# Patient Record
Sex: MALE | Race: BLACK OR AFRICAN AMERICAN | Employment: FULL TIME | URBAN - METROPOLITAN AREA
[De-identification: names, ages, dates, MRNs, and addresses within clinical notes are randomized per-mention and may not be internally consistent; named-entity substitution may affect disease eponyms.]

---

## 2019-05-14 ENCOUNTER — OFFICE VISIT (OUTPATIENT)
Dept: FAMILY MEDICINE CLINIC | Age: 34
End: 2019-05-14

## 2019-05-14 VITALS
OXYGEN SATURATION: 98 % | SYSTOLIC BLOOD PRESSURE: 115 MMHG | BODY MASS INDEX: 33.75 KG/M2 | HEART RATE: 64 BPM | TEMPERATURE: 98 F | RESPIRATION RATE: 18 BRPM | HEIGHT: 66 IN | WEIGHT: 210 LBS | DIASTOLIC BLOOD PRESSURE: 78 MMHG

## 2019-05-14 DIAGNOSIS — M75.101 ROTATOR CUFF SYNDROME, RIGHT: ICD-10-CM

## 2019-05-14 DIAGNOSIS — M54.50 ACUTE BILATERAL LOW BACK PAIN WITHOUT SCIATICA: Primary | ICD-10-CM

## 2019-05-14 RX ORDER — METHOCARBAMOL 500 MG/1
500 TABLET, FILM COATED ORAL
COMMUNITY
Start: 2019-05-07 | End: 2019-05-14

## 2019-05-14 RX ORDER — DICLOFENAC SODIUM 75 MG/1
75 TABLET, DELAYED RELEASE ORAL
COMMUNITY
Start: 2019-05-07 | End: 2020-05-06

## 2019-05-14 RX ORDER — TIZANIDINE 4 MG/1
4 TABLET ORAL
Qty: 30 TAB | Refills: 2 | Status: SHIPPED | OUTPATIENT
Start: 2019-05-14 | End: 2021-05-05

## 2019-05-14 NOTE — LETTER
NOTIFICATION RETURN TO WORK / SCHOOL 
 
5/14/2019 11:32 AM 
 
Mr. Tish Ocampo [address] [facility] [address] To Whom It May Concern: 
 
Tish Ocampo is currently under the care of [address] [place] [address]. He is experiencing back pain. This can be expected to the last 6 weeks. While he is experiencing back pain he should be allowed to restrict his lifting to no more than 20 pounds. He should take care not to lift and twist.  If he follows the lift restriction of 20 pounds he should be able to lift from floor or countertop level. If there are questions or concerns please have the patient contact our office. Sincerely, Kimberly Fulton MD

## 2019-05-14 NOTE — PROGRESS NOTES
.  Chief Complaint   Patient presents with    Back Pain     pain radiates down left thigh    Arm Pain     . 1. Have you been to the ER, urgent care clinic since your last visit? Hospitalized since your last visit? yes    2. Have you seen or consulted any other health care providers outside of the [address] [place] [address] since your last visit? Include any pap smears or colon screening. No    .  Health Maintenance Due   Topic Date Due    DTaP/Tdap/Td series (1 - Tdap) 12/13/2008     . Dashawn Lawrence

## 2019-05-14 NOTE — PATIENT INSTRUCTIONS
Low Back Pain: Exercises  Your Care Instructions  Here are some examples of typical rehabilitation exercises for your condition. Start each exercise slowly. Ease off the exercise if you start to have pain. Your doctor or physical therapist will tell you when you can start these exercises and which ones will work best for you. How to do the exercises  Press-up    1. Lie on your stomach, supporting your body with your forearms. 2. Press your elbows down into the floor to raise your upper back. As you do this, relax your stomach muscles and allow your back to arch without using your back muscles. As your press up, do not let your hips or pelvis come off the floor. 3. Hold for 15 to 30 seconds, then relax. 4. Repeat 2 to 4 times. Alternate arm and leg (bird dog) exercise    1. Start on the floor, on your hands and knees. 2. Tighten your belly muscles. 3. Raise one leg off the floor, and hold it straight out behind you. Be careful not to let your hip drop down, because that will twist your trunk. 4. Hold for about 6 seconds, then lower your leg and switch to the other leg. 5. Repeat 8 to 12 times on each leg. 6. Over time, work up to holding for 10 to 30 seconds each time. 7. If you feel stable and secure with your leg raised, try raising the opposite arm straight out in front of you at the same time. Knee-to-chest exercise    1. Lie on your back with your knees bent and your feet flat on the floor. 2. Bring one knee to your chest, keeping the other foot flat on the floor (or keeping the other leg straight, whichever feels better on your lower back). 3. Keep your lower back pressed to the floor. Hold for at least 15 to 30 seconds. 4. Relax, and lower the knee to the starting position. 5. Repeat with the other leg. Repeat 2 to 4 times with each leg. 6. To get more stretch, put your other leg flat on the floor while pulling your knee to your chest.    Curl-ups    1.  Lie on the floor on your back with your knees bent at a 90-degree angle. Your feet should be flat on the floor, about 12 inches from your buttocks. 2. Cross your arms over your chest. If this bothers your neck, try putting your hands behind your neck (not your head), with your elbows spread apart. 3. Slowly tighten your belly muscles and raise your shoulder blades off the floor. 4. Keep your head in line with your body, and do not press your chin to your chest.  5. Hold this position for 1 or 2 seconds, then slowly lower yourself back down to the floor. 6. Repeat 8 to 12 times. Pelvic tilt exercise    1. Lie on your back with your knees bent. 2. \"Brace\" your stomach. This means to tighten your muscles by pulling in and imagining your belly button moving toward your spine. You should feel like your back is pressing to the floor and your hips and pelvis are rocking back. 3. Hold for about 6 seconds while you breathe smoothly. 4. Repeat 8 to 12 times. Heel dig bridging    1. Lie on your back with both knees bent and your ankles bent so that only your heels are digging into the floor. Your knees should be bent about 90 degrees. 2. Then push your heels into the floor, squeeze your buttocks, and lift your hips off the floor until your shoulders, hips, and knees are all in a straight line. 3. Hold for about 6 seconds as you continue to breathe normally, and then slowly lower your hips back down to the floor and rest for up to 10 seconds. 4. Do 8 to 12 repetitions. Hamstring stretch in doorway    1. Lie on your back in a doorway, with one leg through the open door. 2. Slide your leg up the wall to straighten your knee. You should feel a gentle stretch down the back of your leg. 3. Hold the stretch for at least 15 to 30 seconds. Do not arch your back, point your toes, or bend either knee. Keep one heel touching the floor and the other heel touching the wall. 4. Repeat with your other leg. 5. Do 2 to 4 times for each leg.     Hip flexor stretch    1. Kneel on the floor with one knee bent and one leg behind you. Place your forward knee over your foot. Keep your other knee touching the floor. 2. Slowly push your hips forward until you feel a stretch in the upper thigh of your rear leg. 3. Hold the stretch for at least 15 to 30 seconds. Repeat with your other leg. 4. Do 2 to 4 times on each side. Wall sit    1. Stand with your back 10 to 12 inches away from a wall. 2. Lean into the wall until your back is flat against it. 3. Slowly slide down until your knees are slightly bent, pressing your lower back into the wall. 4. Hold for about 6 seconds, then slide back up the wall. 5. Repeat 8 to 12 times. Follow-up care is a key part of your treatment and safety. Be sure to make and go to all appointments, and call your doctor if you are having problems. It's also a good idea to know your test results and keep a list of the medicines you take. Where can you learn more? Go to http://harjit-july.info/. Enter R951 in the search box to learn more about \"Low Back Pain: Exercises. \"  Current as of: September 20, 2018  Content Version: 11.9  © 6576-3550 [employer]. Care instructions adapted under license by [employer] (which disclaims liability or warranty for this information). If you have questions about a medical condition or this instruction, always ask your healthcare professional. [address] any warranty or liability for your use of this information. Rotator Cuff: Exercises  Your Care Instructions  Here are some examples of typical rehabilitation exercises for your condition. Start each exercise slowly. Ease off the exercise if you start to have pain. Your doctor or physical therapist will tell you when you can start these exercises and which ones will work best for you. How to do the exercises  Pendulum swing    1.  Hold on to a table or the back of a chair with your good arm. Then bend forward a little and let your sore arm hang straight down. This exercise does not use the arm muscles. Rather, use your legs and your hips to create movement that makes your arm swing freely. 2. Use the movement from your hips and legs to guide the slightly swinging arm back and forth like a pendulum (or elephant trunk). Then guide it in circles that start small (about the size of a dinner plate). Make the circles a bit larger each day, as your pain allows. 3. Do this exercise for 5 minutes, 5 to 7 times each day. 4. As you have less pain, try bending over a little farther to do this exercise. This will increase the amount of movement at your shoulder. Posterior stretching exercise    1. Hold the elbow of your injured arm with your other hand. 2. Use your hand to pull your injured arm gently up and across your body. You will feel a gentle stretch across the back of your injured shoulder. 3. Hold for at least 15 to 30 seconds. Then slowly lower your arm. 4. Repeat 2 to 4 times. Up-the-back stretch    1. Put your hand in your back pocket. Let it rest there to stretch your shoulder. 2. With your other hand, hold your injured arm (palm outward) behind your back by the wrist. Pull your arm up gently to stretch your shoulder. 3. Next, put a towel over your other shoulder. Put the hand of your injured arm behind your back. Now hold the back end of the towel. With the other hand, hold the front end of the towel in front of your body. Pull gently on the front end of the towel. This will bring your hand farther up your back to stretch your shoulder. Overhead stretch    1. Standing about an arm's length away, grasp onto a solid surface. You could use a countertop, a doorknob, or the back of a sturdy chair. 2. With your knees slightly bent, bend forward with your arms straight. Lower your upper body, and let your shoulders stretch.   3. As your shoulders are able to stretch farther, you may need to take a step or two backward. 4. Hold for at least 15 to 30 seconds. Then stand up and relax. If you had stepped back during your stretch, step forward so you can keep your hands on the solid surface. 5. Repeat 2 to 4 times. Shoulder flexion (lying down)    1. Lie on your back, holding a wand with both hands. Your palms should face down as you hold the wand. 2. Keeping your elbows straight, slowly raise your arms over your head. Raise them until you feel a stretch in your shoulders, upper back, and chest.  3. Hold for 15 to 30 seconds. 4. Repeat 2 to 4 times. Shoulder rotation (lying down)    1. Lie on your back. Hold a wand with both hands with your elbows bent and palms up. 2. Keep your elbows close to your body, and move the wand across your body toward the sore arm. 3. Hold for 8 to 12 seconds. 4. Repeat 2 to 4 times. Wall climbing (to the side)    1. Stand with your side to a wall so that your fingers can just touch it at an angle about 30 degrees toward the front of your body. 2. Walk the fingers of your injured arm up the wall as high as pain permits. Try not to shrug your shoulder up toward your ear as you move your arm up. 3. Hold that position for a count of at least 15 to 20.  4. Walk your fingers back down to the starting position. 5. Repeat at least 2 to 4 times. Try to reach higher each time. Wall climbing (to the front)    1. Face a wall, and stand so your fingers can just touch it. 2. Keeping your shoulder down, walk the fingers of your injured arm up the wall as high as pain permits. (Don't shrug your shoulder up toward your ear.)  3. Hold your arm in that position for at least 15 to 30 seconds. 4. Slowly walk your fingers back down to where you started. 5. Repeat at least 2 to 4 times. Try to reach higher each time. Shoulder blade squeeze    1. Stand with your arms at your sides, and squeeze your shoulder blades together.  Do not raise your shoulders up as you squeeze. 2. Hold 6 seconds. 3. Repeat 8 to 12 times. Scapular exercise: Arm reach    1. Lie flat on your back. This exercise is a very slight motion that starts with your arms raised (elbows straight, arms straight). 2. From this position, reach higher toward the gaudencio or ceiling. Keep your elbows straight. All motion should be from your shoulder blade only. 3. Relax your arms back to where you started. 4. Repeat 8 to 12 times. Arm raise to the side    1. Slowly raise your injured arm to the side, with your thumb facing up. Raise your arm 60 degrees at the most (shoulder level is 90 degrees). 2. Hold the position for 3 to 5 seconds. Then lower your arm back to your side. If you need to, bring your \"good\" arm across your body and place it under the elbow as you lower your injured arm. Use your good arm to keep your injured arm from dropping down too fast.  3. Repeat 8 to 12 times. 4. When you first start out, don't hold any extra weight in your hand. As you get stronger, you may use a 1-pound to 2-pound dumbbell or a small can of food. Shoulder flexor and extensor exercise    1. Push forward (flex): Stand facing a wall or doorjamb, about 6 inches or less back. Hold your injured arm against your body. Make a closed fist with your thumb on top. Then gently push your hand forward into the wall with about 25% to 50% of your strength. Don't let your body move backward as you push. Hold for about 6 seconds. Relax for a few seconds. Repeat 8 to 12 times. 2. Push backward (extend): Stand with your back flat against a wall. Your upper arm should be against the wall, with your elbow bent 90 degrees (your hand straight ahead). Push your elbow gently back against the wall with about 25% to 50% of your strength. Don't let your body move forward as you push. Hold for about 6 seconds. Relax for a few seconds. Repeat 8 to 12 times. Scapular exercise: Wall push-ups    1.  Stand facing a wall, about 12 inches to 18 inches away. 2. Place your hands on the wall at shoulder height. 3. Slowly bend your elbows and bring your face to the wall. Keep your back and hips straight. 4. Push back to where you started. 5. Repeat 8 to 12 times. 6. When you can do this exercise against a wall comfortably, you can try it against a counter. You can then slowly progress to the end of a couch, then to a sturdy chair, and finally to the floor. Scapular exercise: Retraction    1. Put the band around a solid object at about waist level. (A bedpost will work well.) Each hand should hold an end of the band. 2. With your elbows at your sides and bent to 90 degrees, pull the band back. Your shoulder blades should move toward each other. Then move your arms back where you started. 3. Repeat 8 to 12 times. 4. If you have good range of motion in your shoulders, try this exercise with your arms lifted out to the sides. Keep your elbows at a 90-degree angle. Raise the elastic band up to about shoulder level. Pull the band back to move your shoulder blades toward each other. Then move your arms back where you started. Internal rotator strengthening exercise    1. Start by tying a piece of elastic exercise material to a doorknob. You can use surgical tubing or Thera-Band. 2. Stand or sit with your shoulder relaxed and your elbow bent 90 degrees. Your upper arm should rest comfortably against your side. Squeeze a rolled towel between your elbow and your body for comfort. This will help keep your arm at your side. 3. Hold one end of the elastic band in the hand of the painful arm. 4. Slowly rotate your forearm toward your body until it touches your belly. Slowly move it back to where you started. 5. Keep your elbow and upper arm firmly tucked against the towel roll or at your side. 6. Repeat 8 to 12 times. External rotator strengthening exercise    1. Start by tying a piece of elastic exercise material to a doorknob. You can use surgical tubing or Thera-Band. (You may also hold one end of the band in each hand.)  2. Stand or sit with your shoulder relaxed and your elbow bent 90 degrees. Your upper arm should rest comfortably against your side. Squeeze a rolled towel between your elbow and your body for comfort. This will help keep your arm at your side. 3. Hold one end of the elastic band with the hand of the painful arm. 4. Start with your forearm across your belly. Slowly rotate the forearm out away from your body. Keep your elbow and upper arm tucked against the towel roll or the side of your body until you begin to feel tightness in your shoulder. Slowly move your arm back to where you started. 5. Repeat 8 to 12 times. Follow-up care is a key part of your treatment and safety. Be sure to make and go to all appointments, and call your doctor if you are having problems. It's also a good idea to know your test results and keep a list of the medicines you take. Where can you learn more? Go to http://harjit-july.info/. Enter Lionel Retana in the search box to learn more about \"Rotator Cuff: Exercises. \"  Current as of: September 20, 2018  Content Version: 11.9  © 4671-5393 [employer]. Care instructions adapted under license by [employer] (which disclaims liability or warranty for this information). If you have questions about a medical condition or this instruction, always ask your healthcare professional. [place] [address] any warranty or liability for your use of this information.

## 2019-05-14 NOTE — PROGRESS NOTES
HPI  José Luis Bernard is a 34 y.o. male who Presents as a new patient with back pain. Says he has had back pain of some variety ever since a football injury in high school when he was hit in the back. Says that it is always sore. Back is really been bothering him for the last year but flared on about April 19. He went to urgent care on May 7 and was given Robaxin and diclofenac. Has not felt much relief from this. He has not done physical therapy, has not done stretches. He has continued to go to work at the [employer]. This job involves him lifting anywhere between 20-50 pounds about once every 30 minutes to do weight checks. This will involve lifting from countertop level or lifting from the floor. He demonstrated his lifting technique to me and it does involve a slightly stooped posture. He does occasionally do a lift and twist and occasionally will have to carry heavy objects up and down the stairs. There is no overhead lifting    PMHx:  No past medical history on file. Meds:   Current Outpatient Medications   Medication Sig Dispense Refill    diclofenac EC (VOLTAREN) 75 mg EC tablet Take 75 mg by mouth.  tiZANidine (ZANAFLEX) 4 mg tablet Take 1 Tab by mouth nightly as needed (muscle spasm). 30 Tab 2       Allergies:   No Known Allergies    Smoker:  Social History     Tobacco Use   Smoking Status Former Smoker       ETOH:   Social History     Substance and Sexual Activity   Alcohol Use No       FH: No family history on file. ROS:   As listed in HPI. In addition:  Constitutional:   No headache, fever, fatigue, weight loss or weight gain      Cardiac:    No chest pain      Resp:   No cough or shortness of breath      Neuro   No loss of consciousness, dizziness, seizures      Physical Exam:  Blood pressure 115/78, pulse 64, temperature 98 °F (36.7 °C), temperature source Oral, resp. rate 18, height 5' 6\" (1.676 m), weight 210 lb (95.3 kg), SpO2 98 %. GEN: No apparent distress.  Alert and oriented and responds to all questions appropriately. NEUROLOGIC:  No focal neurologic deficits. Strength and sensation grossly intact. Coordination and gait grossly intact. EXT: Well perfused. No edema. SKIN: No obvious rashes. Low back examined. There is no midline tenderness but there are paraspinal muscles that are tender starting at L2 on the right and L3 on the left. Exquisitely tender in the L4-L5 area on the left, working out there are several myofascial tender points in the erector spinae and quadratus lumborum that are more prominent on the left than the right but they are present bilaterally  He also has an arm complaint today. Has some pain on supraspinatus evaluation but 5/5 strength in the rotator cuff       Assessment and Plan     Low back pain  He feels that he can work but does not feel comfortable lifting more than 20 pounds. Will write a letter restricting him to lifting 20 pounds, do not lift and twist.  But if he follows a 25 liver strict he should be able to lift from the floor and from countertop level    This particular flare is been going on for 6 weeks so will refer to Ortho for comment. He does have some pain shooting down left leg and it is not obvious to me whether this is sciatica, stops i refer to physical therapy  Continue diclofenac  n the thigh    Switch from  Robaxin to Zanaflex    He is establishing care with us. I reviewed his blood work and he did have what looks like a kidney injury a year ago. This is worth bearing in mind for the future      ICD-10-CM ICD-9-CM    1. Acute bilateral low back pain without sciatica M54.5 724.2 tiZANidine (ZANAFLEX) 4 mg tablet     338.19    2. Rotator cuff syndrome, right M75.101 726.10        AVS given.  Pt expressed understanding of instructions

## 2019-05-23 ENCOUNTER — TELEPHONE (OUTPATIENT)
Dept: FAMILY MEDICINE CLINIC | Age: 34
End: 2019-05-23

## 2019-05-23 NOTE — TELEPHONE ENCOUNTER
Attempted to call patient with these questions but no answer and no voicemail available    Received paperwork from [facility] for patient. I put him on a lift restricted 20 pounds. A little confused by the paperwork that I received. Got FMLA paperwork and short-term disability paperwork. I need more information. Is he not working? Why is he not working?  (I.e. did the lift restriction mean that he could not go to work at all)   When did he stop working.

## 2019-05-24 NOTE — TELEPHONE ENCOUNTER
Filled out FMLA and short-term disability forms. Estimated out of work 5/13/2019-8/13/2019 (3 months) but really this will be determined by the work-up he is currently undergoing with orthopedics. There was also a form \"authorization return to work form\" that would need to be filled out before returning without restriction.   Do not have a good way to keep track of this for months from now so they will need to resend this form when it needs to be completed

## 2019-05-24 NOTE — TELEPHONE ENCOUNTER
5/13/19 was the last day he worked, the type of work he does if he can't lift more than 20 lbs they will not let him work.

## 2019-05-30 ENCOUNTER — TELEPHONE (OUTPATIENT)
Dept: FAMILY MEDICINE CLINIC | Age: 34
End: 2019-05-30

## 2019-06-13 ENCOUNTER — TELEPHONE (OUTPATIENT)
Dept: FAMILY MEDICINE CLINIC | Age: 34
End: 2019-06-13

## 2019-06-13 NOTE — TELEPHONE ENCOUNTER
Pt said the back dr told him that nothing  Was seen on the MRI and he wants a work note to go back to work.  Pt said please give him a call to let him know once the work note is done or what it is that is needed [phone]

## 2019-06-13 NOTE — LETTER
NOTIFICATION RETURN TO WORK  
 
6/14/2019 10:10 AM 
 
Mr. China Philippe [address] [facility] [id number] To Whom It May Concern: 
 
China Philippe is currently under the care of [address] [place] [address]. He has been evaluated by orthopedist for his back pain. His symptoms have improved. Orthopedist has released him for return to full duty without restriction as of 6/17/2019 If there are questions or concerns please have the patient contact our office. Sincerely, Ade Howard MD

## 2019-06-14 ENCOUNTER — DOCUMENTATION ONLY (OUTPATIENT)
Dept: FAMILY MEDICINE CLINIC | Age: 34
End: 2019-06-14

## 2019-06-14 NOTE — PROGRESS NOTES
Note completed and place at . Called pt and couldn't leave a voice mail will try again later. Dalila Su

## 2019-06-17 NOTE — TELEPHONE ENCOUNTER
Pt returned call, verified name and  and voiced to pt that he needed to come to our office for  for his letter, he voiced understanding. Pt inquired about a \"checklist\" from his HR. Voiced to pt that if there is a certain form he needs, he will need to have it faxed to our office, or dropped off. He voiced understanding of instructions.

## 2019-08-09 ENCOUNTER — TELEPHONE (OUTPATIENT)
Dept: FAMILY MEDICINE CLINIC | Age: 34
End: 2019-08-09

## 2019-08-12 NOTE — TELEPHONE ENCOUNTER
Not sure specifically what he is referring to. I had a bunch of paperwork from him. Looking through scanning the most recent thing we have is a checklist returning him to full duty. This was faxed June 18. Is this what he is referring to?

## 2019-08-14 ENCOUNTER — TELEPHONE (OUTPATIENT)
Dept: FAMILY MEDICINE CLINIC | Age: 34
End: 2019-08-14

## 2019-08-14 NOTE — TELEPHONE ENCOUNTER
Patient calling in reference to [facility] paperwork.  best contact is [phone]

## 2019-08-14 NOTE — TELEPHONE ENCOUNTER
Short answer is no, but there is an FMLA form from May in the scanned media    Not sure what he would need in addition to this.   He should have been back at work for the last few months

## 2019-09-05 ENCOUNTER — TELEPHONE (OUTPATIENT)
Dept: FAMILY MEDICINE CLINIC | Age: 34
End: 2019-09-05

## 2019-09-05 NOTE — TELEPHONE ENCOUNTER
Called pt and verified name and . Scheduled pt to be seen on Tuesday per Dr. Slim Araiza, he voiced understanding.

## 2019-09-30 ENCOUNTER — TELEPHONE (OUTPATIENT)
Dept: FAMILY MEDICINE CLINIC | Age: 34
End: 2019-09-30

## 2019-09-30 NOTE — TELEPHONE ENCOUNTER
It is on my desk waiting for him to come in. He has missed the last couple appointments.   I do not know why he needs it and he has been not been reachable by phone

## 2019-09-30 NOTE — TELEPHONE ENCOUNTER
Pt is calling wanting to speak with Dr Valeria Rivera in ref to his [place] paper work he can come in if needed

## 2019-10-01 ENCOUNTER — TELEPHONE (OUTPATIENT)
Dept: FAMILY MEDICINE CLINIC | Age: 34
End: 2019-10-01

## 2019-10-07 ENCOUNTER — OFFICE VISIT (OUTPATIENT)
Dept: FAMILY MEDICINE CLINIC | Age: 34
End: 2019-10-07

## 2019-10-07 VITALS
HEIGHT: 66 IN | DIASTOLIC BLOOD PRESSURE: 69 MMHG | RESPIRATION RATE: 16 BRPM | BODY MASS INDEX: 33.4 KG/M2 | TEMPERATURE: 98.3 F | WEIGHT: 207.8 LBS | HEART RATE: 62 BPM | OXYGEN SATURATION: 98 % | SYSTOLIC BLOOD PRESSURE: 118 MMHG

## 2019-10-07 DIAGNOSIS — M75.21 BICEPS TENDINITIS OF RIGHT UPPER EXTREMITY: ICD-10-CM

## 2019-10-07 DIAGNOSIS — M75.101 ROTATOR CUFF SYNDROME OF RIGHT SHOULDER: Primary | ICD-10-CM

## 2019-10-07 DIAGNOSIS — M54.50 CHRONIC LEFT-SIDED LOW BACK PAIN WITHOUT SCIATICA: ICD-10-CM

## 2019-10-07 DIAGNOSIS — G89.29 CHRONIC LEFT-SIDED LOW BACK PAIN WITHOUT SCIATICA: ICD-10-CM

## 2019-10-07 NOTE — PATIENT INSTRUCTIONS
Rotator Cuff: Exercises  Introduction  Here are some examples of exercises for you to try. The exercises may be suggested for a condition or for rehabilitation. Start each exercise slowly. Ease off the exercises if you start to have pain. You will be told when to start these exercises and which ones will work best for you. How to do the exercises  Pendulum swing    1. Hold on to a table or the back of a chair with your good arm. Then bend forward a little and let your sore arm hang straight down. This exercise does not use the arm muscles. Rather, use your legs and your hips to create movement that makes your arm swing freely. 2. Use the movement from your hips and legs to guide the slightly swinging arm back and forth like a pendulum (or elephant trunk). Then guide it in circles that start small (about the size of a dinner plate). Make the circles a bit larger each day, as your pain allows. 3. Do this exercise for 5 minutes, 5 to 7 times each day. 4. As you have less pain, try bending over a little farther to do this exercise. This will increase the amount of movement at your shoulder. Posterior stretching exercise    1. Hold the elbow of your injured arm with your other hand. 2. Use your hand to pull your injured arm gently up and across your body. You will feel a gentle stretch across the back of your injured shoulder. 3. Hold for at least 15 to 30 seconds. Then slowly lower your arm. 4. Repeat 2 to 4 times. Up-the-back stretch    1. Put your hand in your back pocket. Let it rest there to stretch your shoulder. 2. With your other hand, hold your injured arm (palm outward) behind your back by the wrist. Pull your arm up gently to stretch your shoulder. 3. Next, put a towel over your other shoulder. Put the hand of your injured arm behind your back. Now hold the back end of the towel. With the other hand, hold the front end of the towel in front of your body.  Pull gently on the front end of the towel. This will bring your hand farther up your back to stretch your shoulder. Overhead stretch    1. Standing about an arm's length away, grasp onto a solid surface. You could use a countertop, a doorknob, or the back of a sturdy chair. 2. With your knees slightly bent, bend forward with your arms straight. Lower your upper body, and let your shoulders stretch. 3. As your shoulders are able to stretch farther, you may need to take a step or two backward. 4. Hold for at least 15 to 30 seconds. Then stand up and relax. If you had stepped back during your stretch, step forward so you can keep your hands on the solid surface. 5. Repeat 2 to 4 times. Shoulder flexion (lying down)    1. Lie on your back, holding a wand with both hands. Your palms should face down as you hold the wand. 2. Keeping your elbows straight, slowly raise your arms over your head. Raise them until you feel a stretch in your shoulders, upper back, and chest.  3. Hold for 15 to 30 seconds. 4. Repeat 2 to 4 times. Shoulder rotation (lying down)    1. Lie on your back. Hold a wand with both hands with your elbows bent and palms up. 2. Keep your elbows close to your body, and move the wand across your body toward the sore arm. 3. Hold for 8 to 12 seconds. 4. Repeat 2 to 4 times. Wall climbing (to the side)    1. Stand with your side to a wall so that your fingers can just touch it at an angle about 30 degrees toward the front of your body. 2. Walk the fingers of your injured arm up the wall as high as pain permits. Try not to shrug your shoulder up toward your ear as you move your arm up. 3. Hold that position for a count of at least 15 to 20.  4. Walk your fingers back down to the starting position. 5. Repeat at least 2 to 4 times. Try to reach higher each time. Wall climbing (to the front)    1. Face a wall, and stand so your fingers can just touch it.   2. Keeping your shoulder down, walk the fingers of your injured arm up the wall as high as pain permits. (Don't shrug your shoulder up toward your ear.)  3. Hold your arm in that position for at least 15 to 30 seconds. 4. Slowly walk your fingers back down to where you started. 5. Repeat at least 2 to 4 times. Try to reach higher each time. Shoulder blade squeeze    1. Stand with your arms at your sides, and squeeze your shoulder blades together. Do not raise your shoulders up as you squeeze. 2. Hold 6 seconds. 3. Repeat 8 to 12 times. Scapular exercise: Arm reach    1. Lie flat on your back. This exercise is a very slight motion that starts with your arms raised (elbows straight, arms straight). 2. From this position, reach higher toward the g[place] or ceiling. Keep your elbows straight. All motion should be from your shoulder blade only. 3. Relax your arms back to where you started. 4. Repeat 8 to 12 times. Arm raise to the side    1. Slowly raise your injured arm to the side, with your thumb facing up. Raise your arm 60 degrees at the most (shoulder level is 90 degrees). 2. Hold the position for 3 to 5 seconds. Then lower your arm back to your side. If you need to, bring your \"good\" arm across your body and place it under the elbow as you lower your injured arm. Use your good arm to keep your injured arm from dropping down too fast.  3. Repeat 8 to 12 times. 4. When you first start out, don't hold any extra weight in your hand. As you get stronger, you may use a 1-pound to 2-pound dumbbell or a small can of food. Shoulder flexor and extensor exercise    1. Push forward (flex): Stand facing a wall or doorjamb, about 6 inches or less back. Hold your injured arm against your body. Make a closed fist with your thumb on top. Then gently push your hand forward into the wall with about 25% to 50% of your strength. Don't let your body move backward as you push. Hold for about 6 seconds. Relax for a few seconds. Repeat 8 to 12 times.   2. Push backward (extend): Stand with your back flat against a wall. Your upper arm should be against the wall, with your elbow bent 90 degrees (your hand straight ahead). Push your elbow gently back against the wall with about 25% to 50% of your strength. Don't let your body move forward as you push. Hold for about 6 seconds. Relax for a few seconds. Repeat 8 to 12 times. Scapular exercise: Wall push-ups    1. Stand facing a wall, about 12 inches to 18 inches away. 2. Place your hands on the wall at shoulder height. 3. Slowly bend your elbows and bring your face to the wall. Keep your back and hips straight. 4. Push back to where you started. 5. Repeat 8 to 12 times. 6. When you can do this exercise against a wall comfortably, you can try it against a counter. You can then slowly progress to the end of a couch, then to a sturdy chair, and finally to the floor. Scapular exercise: Retraction    1. Put the band around a solid object at about waist level. (A bedpost will work well.) Each hand should hold an end of the band. 2. With your elbows at your sides and bent to 90 degrees, pull the band back. Your shoulder blades should move toward each other. Then move your arms back where you started. 3. Repeat 8 to 12 times. 4. If you have good range of motion in your shoulders, try this exercise with your arms lifted out to the sides. Keep your elbows at a 90-degree angle. Raise the elastic band up to about shoulder level. Pull the band back to move your shoulder blades toward each other. Then move your arms back where you started. Internal rotator strengthening exercise    1. Start by tying a piece of elastic exercise material to a doorknob. You can use surgical tubing or Thera-Band. 2. Stand or sit with your shoulder relaxed and your elbow bent 90 degrees. Your upper arm should rest comfortably against your side. Squeeze a rolled towel between your elbow and your body for comfort. This will help keep your arm at your side.   3. Hold one end of the elastic band in the hand of the painful arm. 4. Slowly rotate your forearm toward your body until it touches your belly. Slowly move it back to where you started. 5. Keep your elbow and upper arm firmly tucked against the towel roll or at your side. 6. Repeat 8 to 12 times. External rotator strengthening exercise    1. Start by tying a piece of elastic exercise material to a doorknob. You can use surgical tubing or Thera-Band. (You may also hold one end of the band in each hand.)  2. Stand or sit with your shoulder relaxed and your elbow bent 90 degrees. Your upper arm should rest comfortably against your side. Squeeze a rolled towel between your elbow and your body for comfort. This will help keep your arm at your side. 3. Hold one end of the elastic band with the hand of the painful arm. 4. Start with your forearm across your belly. Slowly rotate the forearm out away from your body. Keep your elbow and upper arm tucked against the towel roll or the side of your body until you begin to feel tightness in your shoulder. Slowly move your arm back to where you started. 5. Repeat 8 to 12 times. Follow-up care is a key part of your treatment and safety. Be sure to make and go to all appointments, and call your doctor if you are having problems. It's also a good idea to know your test results and keep a list of the medicines you take. Where can you learn more? Go to http://harjit-july.info/. Enter Dale Siegel in the search box to learn more about \"Rotator Cuff: Exercises. \"  Current as of: June 26, 2019  Content Version: 12.2  © 4442-3847 [employer]. Care instructions adapted under license by [employer] (which disclaims liability or warranty for this information).  If you have questions about a medical condition or this instruction, always ask your healthcare professional. [address]  any warranty or liability for your use of this information.

## 2019-10-07 NOTE — PROGRESS NOTES
HPI  Timbo Segal is a 34 y.o. male w presents follow-up on back pain and get FMLA paperwork filled out. He returned to work in June ho cleared by his orthopedist.  About 2 weeks later had a flare of his back pain and needed to leave work. Has been asked by his HR department to get FMLA paperwork for episodic flares filled out since. He has not needed to leave work since but has been tempted about once per month because of pain in the flares up with bending. Typically the pain is left-sided. It is relieved by rest, warm compress, hot shower. If he goes to bed with pain he will wake out without pain. He typically needs to take diclofenac twice a day for the pain. More after the not require Zanaflex to help with sleep. Physical therapy was helpful. He is occasionally doing the exercises. MRI apparently showed foraminal stenosis L4-S1    PMHx:  No past medical history on file. Meds:   Current Outpatient Medications   Medication Sig Dispense Refill    diclofenac EC (VOLTAREN) 75 mg EC tablet Take 75 mg by mouth.  tiZANidine (ZANAFLEX) 4 mg tablet Take 1 Tab by mouth nightly as needed (muscle spasm). 30 Tab 2       Allergies:   No Known Allergies    Smoker:  Social History     Tobacco Use   Smoking Status Former Smoker   Smokeless Tobacco Never Used       ETOH:   Social History     Substance and Sexual Activity   Alcohol Use No       FH: No family history on file. ROS:   As listed in HPI. In addition:  Constitutional:   No headache, fever, fatigue, weight loss or weight gain      Cardiac:    No chest pain      Resp:   No cough or shortness of breath      Neuro   No loss of consciousness, dizziness, seizures      Physical Exam:  Blood pressure 118/69, pulse 62, temperature 98.3 °F (36.8 °C), temperature source Oral, resp. rate 16, height 5' 6\" (1.676 m), weight 207 lb 12.8 oz (94.3 kg), SpO2 98 %. GEN: No apparent distress.  Alert and oriented and responds to all questions appropriately. NEUROLOGIC:  No focal neurologic deficits. Strength and sensation grossly intact. Coordination and gait grossly intact. EXT: Well perfused. No edema. SKIN: No obvious rashes. Low back examined. There is no midline pain. There is no paraspinal pain. There is erector spinae quadratus lumborum pain on the left is exquisitely tender to palpation    He appears to have right shoulder pain. There is 4/5 strength in supraspinatus. Rest of the rotator cuff is intact. There is pain on use of biceps and the biceps tendon. Has an apparently separate issue there is a lump overlying the deltoid that is painful to patient. He identifies this as the point where those other provocative maneuvers hurt. This lump is fairly flat, soft. It is tender to palpation. Resembles a lipoma       Assessment and Plan     Low back pain  Continues to have episodic flares usually last less than 24 hours  [place] paperwork excusing him from work up to 3 days out of the week for about 24 hours for a flare. Advocate continued and judicious use of NSAIDs and muscle relaxers  Physical therapy as needed    Right rotator cuff syndrome. Did appear to have supraspinatus and biceps tendinitis is on exam but also has this lump that I think is a lipoma but should not be causing the symptoms he is describing. Will order ultrasound to work this up  Exercises for the rotator cuff syndrome      ICD-10-CM ICD-9-CM    1. Rotator cuff syndrome of right shoulder M75.101 726.10 US SHOULDER RT COMP   2. Biceps tendinitis of right upper extremity M75.21 726.12 US SHOULDER RT COMP   3. Lump R22.9 782.2 US SHOULDER RT COMP   4. Chronic left-sided low back pain without sciatica M54.5 724.2     G89.29 338.29        AVS given.  Pt expressed understanding of instructions

## 2019-10-08 ENCOUNTER — TELEPHONE (OUTPATIENT)
Dept: FAMILY MEDICINE CLINIC | Age: 34
End: 2019-10-08

## 2019-10-08 DIAGNOSIS — M75.101 ROTATOR CUFF SYNDROME OF RIGHT SHOULDER: Primary | ICD-10-CM

## 2019-10-08 DIAGNOSIS — M25.511 ACUTE PAIN OF RIGHT SHOULDER: ICD-10-CM

## 2019-10-08 NOTE — TELEPHONE ENCOUNTER
Jimi Roland from [employer] is calling to get a new order for patient. She stated that she is not able to schedule the ultrasound right complete shoulder. She stated that the new order should be for ultrasound upper extremity and specify right shoulder. She stated she can be reached at [phone].

## 2019-10-18 ENCOUNTER — TELEPHONE (OUTPATIENT)
Dept: FAMILY MEDICINE CLINIC | Age: 34
End: 2019-10-18

## 2019-10-18 NOTE — TELEPHONE ENCOUNTER
Pt called stating he hadn't gotten an appt scheduled for the image of his shoulder. I suggested he call [facility] and ask for scheduling.  Dr. Mannie Jensen had written the referral.

## 2020-11-10 ENCOUNTER — TELEPHONE (OUTPATIENT)
Dept: FAMILY MEDICINE CLINIC | Age: 35
End: 2020-11-10

## 2020-11-10 NOTE — TELEPHONE ENCOUNTER
verified. Pt will be needing his FMLA updated for a longer timeframe for his chronic back flare-ups.

## 2020-11-11 NOTE — TELEPHONE ENCOUNTER
I have the paperwork. Has been over 1 year since he has been seen. Will need appointment to discuss specifics.   Virtual visit would be fine Per Dr Valdez:  Since she had an extensive APC treatment of multiple gastric AVMs, she should complete 4 weeks of sucralfate, and then stop. Her HGB is improved.

## 2021-05-05 ENCOUNTER — OFFICE VISIT (OUTPATIENT)
Dept: FAMILY MEDICINE CLINIC | Age: 36
End: 2021-05-05
Payer: COMMERCIAL

## 2021-05-05 VITALS
DIASTOLIC BLOOD PRESSURE: 61 MMHG | RESPIRATION RATE: 14 BRPM | BODY MASS INDEX: 33.37 KG/M2 | WEIGHT: 207.6 LBS | TEMPERATURE: 97.4 F | HEART RATE: 62 BPM | SYSTOLIC BLOOD PRESSURE: 98 MMHG | OXYGEN SATURATION: 96 % | HEIGHT: 66 IN

## 2021-05-05 DIAGNOSIS — G89.29 CHRONIC LEFT-SIDED LOW BACK PAIN WITHOUT SCIATICA: ICD-10-CM

## 2021-05-05 DIAGNOSIS — M54.50 CHRONIC LEFT-SIDED LOW BACK PAIN WITHOUT SCIATICA: ICD-10-CM

## 2021-05-05 DIAGNOSIS — M75.101 ROTATOR CUFF SYNDROME OF RIGHT SHOULDER: Primary | ICD-10-CM

## 2021-05-05 DIAGNOSIS — M62.838 TRAPEZIUS MUSCLE SPASM: ICD-10-CM

## 2021-05-05 PROCEDURE — 99214 OFFICE O/P EST MOD 30 MIN: CPT | Performed by: FAMILY MEDICINE

## 2021-05-05 RX ORDER — ALBUTEROL SULFATE 90 UG/1
2 AEROSOL, METERED RESPIRATORY (INHALATION)
COMMUNITY
Start: 2020-09-09 | End: 2021-09-09

## 2021-05-05 RX ORDER — TIZANIDINE 4 MG/1
4 TABLET ORAL
Qty: 30 TAB | Refills: 2 | OUTPATIENT
Start: 2021-05-05 | End: 2022-08-31

## 2021-05-05 RX ORDER — IBUPROFEN 600 MG/1
600 TABLET ORAL
Qty: 90 TAB | Refills: 2 | OUTPATIENT
Start: 2021-05-05 | End: 2022-08-31

## 2021-05-05 NOTE — PROGRESS NOTES
HPI  Camryn Schaeffer is a 37 y.o. male who presents to follow-up on back pain and right shoulder pain. This is a chronic issue. I last saw him for this in 2019. He had done some physical therapy with some relief. Had seen orthopedist and was diagnosed with foraminal stenosis L4-S1 based on an MRI. Since I have seen him he has been in more or less constant pain with occasional flareups with prolonged standing or if he lifts the wrong way. He has been missing work about twice a week because of back pain recently and he ran out of [employer] days off. He is also had some shoulder pain that he brought up with me 2 years ago. At that time it appeared to be a rotator cuff syndrome    PMHx:  History reviewed. No pertinent past medical history. Meds:   Current Outpatient Medications   Medication Sig Dispense Refill    albuterol (PROVENTIL HFA, VENTOLIN HFA, PROAIR HFA) 90 mcg/actuation inhaler Take 2 Puffs by inhalation every four (4) hours as needed.  ibuprofen (MOTRIN) 600 mg tablet Take 1 Tab by mouth every six (6) hours as needed for Pain. 90 Tab 2    tiZANidine (ZANAFLEX) 4 mg tablet Take 1 Tab by mouth nightly as needed for Muscle Spasm(s). 30 Tab 2       Allergies:   No Known Allergies    Smoker:  Social History     Tobacco Use   Smoking Status Current Every Day Smoker    Packs/day: 1.00    Years: 11.00    Pack years: 11.00    Types: Cigarettes   Smokeless Tobacco Never Used       ETOH:   Social History     Substance and Sexual Activity   Alcohol Use No       FH: History reviewed. No pertinent family history. ROS:   As listed in HPI. In addition:  Constitutional:   No headache, fever, fatigue, weight loss or weight gain      Cardiac:    No chest pain      Resp:   No cough or shortness of breath      Neuro   No loss of consciousness, dizziness, seizures      Physical Exam:  Blood pressure 98/61, pulse 62, temperature 97.4 °F (36.3 °C), temperature source Temporal, resp.  rate 14, height 5' 6\" (1.676 m), weight 207 lb 9.6 oz (94.2 kg), SpO2 96 %. GEN: No apparent distress. Alert and oriented and responds to all questions appropriately. NEUROLOGIC:  No focal neurologic deficits. Strength and sensation grossly intact. Coordination and gait grossly intact. EXT: Well perfused. No edema. SKIN: No obvious rashes. Lungs clear to auscultation bilaterally  CV regular rhythm no murmur  Back examined. There is midline bony tenderness L2, L3. No tenderness below that. There is paraspinal muscle tenderness at the level of L1-L3 and erector spinae tenderness at the same level. These points are myofascial.    The trapezius is several myofascial tender points in superior middle and inferior trapezius. The trapezius hurts while examining the rotator cuff making it difficult to determine if the pain is due to trapezius or rotator cuff muscles. 4/5 strength in supraspinatus limited due to pain. 5/5 strength in the rest of the rotator cuff       Assessment and Plan     Low back pain  This is a little worse now has some bony tenderness symptoms not present the last time I examined him. This is a daily problem and I think it would be reasonable to refer back to orthopedist.    He recalls physical therapy being helpful in the past.  Will rerefer  He found high-dose Motrin to be the most useful of the anti-inflammatory she is tried so we will refill that  Combination of Motrin and tizanidine helped him to get a good night sleep. Shoulder  I think this would be particularly amenable to physical therapy. I think the primary problem is trapezius muscle pain. He has a lump on his shoulder that he has brought up before. This is a lipoma and is nontender to palpation      I filled [place] paperwork for him in the past.  He will probably require 2 days out of work per week for flare      ICD-10-CM ICD-9-CM    1. Rotator cuff syndrome of right shoulder  M75.101 726.10 REFERRAL TO PHYSICAL THERAPY   2.  Chronic left-sided low back pain without sciatica  M54.5 724.2 ibuprofen (MOTRIN) 600 mg tablet    G89.29 338.29 tiZANidine (ZANAFLEX) 4 mg tablet      REFERRAL TO ORTHOPEDICS      REFERRAL TO PHYSICAL THERAPY   3. Trapezius muscle spasm  M62.838 728.85 REFERRAL TO PHYSICAL THERAPY       AVS given.  Pt expressed understanding of instructions

## 2021-05-05 NOTE — PATIENT INSTRUCTIONS
Rotator Cuff: Exercises  Introduction  Here are some examples of exercises for you to try. The exercises may be suggested for a condition or for rehabilitation. Start each exercise slowly. Ease off the exercises if you start to have pain. You will be told when to start these exercises and which ones will work best for you. How to do the exercises  Pendulum swing   If you have pain in your back, do not do this exercise. 1. Hold on to a table or the back of a chair with your good arm. Then bend forward a little and let your sore arm hang straight down. This exercise does not use the arm muscles. Rather, use your legs and your hips to create movement that makes your arm swing freely. 2. Use the movement from your hips and legs to guide the slightly swinging arm back and forth like a pendulum (or elephant trunk). Then guide it in circles that start small (about the size of a dinner plate). Make the circles a bit larger each day, as your pain allows. 3. Do this exercise for 5 minutes, 5 to 7 times each day. 4. As you have less pain, try bending over a little farther to do this exercise. This will increase the amount of movement at your shoulder. Posterior stretching exercise   1. Hold the elbow of your injured arm with your other hand. 2. Use your hand to pull your injured arm gently up and across your body. You will feel a gentle stretch across the back of your injured shoulder. 3. Hold for at least 15 to 30 seconds. Then slowly lower your arm. 4. Repeat 2 to 4 times. Up-the-back stretch   Your doctor or physical therapist may want you to wait to do this stretch until you have regained most of your range of motion and strength. You can do this stretch in different ways. Hold any of these stretches for at least 15 to 30 seconds. Repeat them 2 to 4 times. 1. Light stretch: Put your hand in your back pocket. Let it rest there to stretch your shoulder. 2. Moderate stretch:  With your other hand, hold your injured arm (palm outward) behind your back by the wrist. Pull your arm up gently to stretch your shoulder. 3. Advanced stretch: Put a towel over your other shoulder. Put the hand of your injured arm behind your back. Now hold the back end of the towel. With the other hand, hold the front end of the towel in front of your body. Pull gently on the front end of the towel. This will bring your hand farther up your back to stretch your shoulder. Overhead stretch   1. Standing about an arm's length away, grasp onto a solid surface. You could use a countertop, a doorknob, or the back of a sturdy chair. 2. With your knees slightly bent, bend forward with your arms straight. Lower your upper body, and let your shoulders stretch. 3. As your shoulders are able to stretch farther, you may need to take a step or two backward. 4. Hold for at least 15 to 30 seconds. Then stand up and relax. If you had stepped back during your stretch, step forward so you can keep your hands on the solid surface. 5. Repeat 2 to 4 times. Shoulder flexion (lying down)   To make a wand for this exercise, use a piece of PVC pipe or a broom handle with the broom removed. Make the wand about a foot wider than your shoulders. 1. Lie on your back, holding a wand with both hands. Your palms should face down as you hold the wand. 2. Keeping your elbows straight, slowly raise your arms over your head. Raise them until you feel a stretch in your shoulders, upper back, and chest.  3. Hold for 15 to 30 seconds. 4. Repeat 2 to 4 times. Shoulder rotation (lying down)   To make a wand for this exercise, use a piece of PVC pipe or a broom handle with the broom removed. Make the wand about a foot wider than your shoulders. 1. Lie on your back. Hold a wand with both hands with your elbows bent and palms up. 2. Keep your elbows close to your body, and move the wand across your body toward the sore arm. 3. Hold for 8 to 12 seconds.   4. Repeat 2 to 4 times. Wall climbing (to the side)   Avoid any movement that is straight to your side, and be careful not to arch your back. Your arm should stay about 30 degrees to the front of your side. 1. Stand with your side to a wall so that your fingers can just touch it at an angle about 30 degrees toward the front of your body. 2. Walk the fingers of your injured arm up the wall as high as pain permits. Try not to shrug your shoulder up toward your ear as you move your arm up. 3. Hold that position for a count of at least 15 to 20.  4. Walk your fingers back down to the starting position. 5. Repeat at least 2 to 4 times. Try to reach higher each time. Wall climbing (to the front)   During this stretching exercise, be careful not to arch your back. 1. Face a wall, and stand so your fingers can just touch it. 2. Keeping your shoulder down, walk the fingers of your injured arm up the wall as high as pain permits. (Don't shrug your shoulder up toward your ear.)  3. Hold your arm in that position for at least 15 to 30 seconds. 4. Slowly walk your fingers back down to where you started. 5. Repeat at least 2 to 4 times. Try to reach higher each time. Shoulder blade squeeze   1. Stand with your arms at your sides, and squeeze your shoulder blades together. Do not raise your shoulders up as you squeeze. 2. Hold 6 seconds. 3. Repeat 8 to 12 times. Scapular exercise: Arm reach   1. Lie flat on your back. This exercise is a very slight motion that starts with your arms raised (elbows straight, arms straight). 2. From this position, reach higher toward the gaudencio or ceiling. Keep your elbows straight. All motion should be from your shoulder blade only. 3. Relax your arms back to where you started. 4. Repeat 8 to 12 times. Arm raise to the side   During this strengthening exercise, your arm should stay about 30 degrees to the front of your side.   1. Slowly raise your injured arm to the side, with your thumb facing up. Raise your arm 60 degrees at the most (shoulder level is 90 degrees). 2. Hold the position for 3 to 5 seconds. Then lower your arm back to your side. If you need to, bring your \"good\" arm across your body and place it under the elbow as you lower your injured arm. Use your good arm to keep your injured arm from dropping down too fast.  3. Repeat 8 to 12 times. 4. When you first start out, don't hold any extra weight in your hand. As you get stronger, you may use a 1-pound to 2-pound dumbbell or a small can of food. Shoulder flexor and extensor exercise   These are isometric exercises. That means you contract your muscles without actually moving. 1. Push forward (flex): Stand facing a wall or doorjamb, about 6 inches or less back. Hold your injured arm against your body. Make a closed fist with your thumb on top. Then gently push your hand forward into the wall with about 25% to 50% of your strength. Don't let your body move backward as you push. Hold for about 6 seconds. Relax for a few seconds. Repeat 8 to 12 times. 2. Push backward (extend): Stand with your back flat against a wall. Your upper arm should be against the wall, with your elbow bent 90 degrees (your hand straight ahead). Push your elbow gently back against the wall with about 25% to 50% of your strength. Don't let your body move forward as you push. Hold for about 6 seconds. Relax for a few seconds. Repeat 8 to 12 times. Scapular exercise: Wall push-ups   This exercise is best done with your fingers somewhat turned out, rather than straight up and down. 1. Stand facing a wall, about 12 inches to 18 inches away. 2. Place your hands on the wall at shoulder height. 3. Slowly bend your elbows and bring your face to the wall. Keep your back and hips straight. 4. Push back to where you started. 5. Repeat 8 to 12 times. 6. When you can do this exercise against a wall comfortably, you can try it against a counter.  You can then slowly progress to the end of a couch, then to a sturdy chair, and finally to the floor. Scapular exercise: Retraction   For this exercise, you will need elastic exercise material, such as surgical tubing or Thera-Band. 1. Put the band around a solid object at about waist level. (A bedpost will work well.) Each hand should hold an end of the band. 2. With your elbows at your sides and bent to 90 degrees, pull the band back. Your shoulder blades should move toward each other. Then move your arms back where you started. 3. Repeat 8 to 12 times. 4. If you have good range of motion in your shoulders, try this exercise with your arms lifted out to the sides. Keep your elbows at a 90-degree angle. Raise the elastic band up to about shoulder level. Pull the band back to move your shoulder blades toward each other. Then move your arms back where you started. Internal rotator strengthening exercise   1. Start by tying a piece of elastic exercise material to a doorknob. You can use surgical tubing or Thera-Band. 2. Stand or sit with your shoulder relaxed and your elbow bent 90 degrees. Your upper arm should rest comfortably against your side. Squeeze a rolled towel between your elbow and your body for comfort. This will help keep your arm at your side. 3. Hold one end of the elastic band in the hand of the painful arm. 4. Slowly rotate your forearm toward your body until it touches your belly. Slowly move it back to where you started. 5. Keep your elbow and upper arm firmly tucked against the towel roll or at your side. 6. Repeat 8 to 12 times. External rotator strengthening exercise   1. Start by tying a piece of elastic exercise material to a doorknob. You can use surgical tubing or Thera-Band. (You may also hold one end of the band in each hand.)  2. Stand or sit with your shoulder relaxed and your elbow bent 90 degrees. Your upper arm should rest comfortably against your side.  Squeeze a rolled towel between your elbow and your body for comfort. This will help keep your arm at your side. 3. Hold one end of the elastic band with the hand of the painful arm. 4. Start with your forearm across your belly. Slowly rotate the forearm out away from your body. Keep your elbow and upper arm tucked against the towel roll or the side of your body until you begin to feel tightness in your shoulder. Slowly move your arm back to where you started. 5. Repeat 8 to 12 times. Follow-up care is a key part of your treatment and safety. Be sure to make and go to all appointments, and call your doctor if you are having problems. It's also a good idea to know your test results and keep a list of the medicines you take. Where can you learn more? Go to http://www.gray.com/  Enter J005 in the search box to learn more about \"Rotator Cuff: Exercises. \"  Current as of: November 16, 2020               Content Version: 12.8  © 2006-2021 [employer]. Care instructions adapted under license by [employer] (which disclaims liability or warranty for this information). If you have questions about a medical condition or this instruction, always ask your healthcare professional. [place] [address] any warranty or liability for your use of this information. Neck : Rehab Exercises  Introduction  Here are some examples of exercises for you to try. The exercises may be suggested for a condition or for rehabilitation. Start each exercise slowly. Ease off the exercises if you start to have pain. You will be told when to start these exercises and which ones will work best for you. How to do the exercises  Neck rotation   1. Sit in a firm chair, or stand up straight. 2. Keeping your chin level, turn your head to the right, and hold for 15 to 30 seconds. 3. Turn your head to the left and hold for 15 to 30 seconds. 4. Repeat 2 to 4 times to each side.     Neck stretches 1. Look straight ahead, and tip your right ear to your right shoulder. Do not let your left shoulder rise up as you tip your head to the right. 2. Hold for 15 to 30 seconds. 3. Tilt your head to the left. Do not let your right shoulder rise up as you tip your head to the left. 4. Hold for 15 to 30 seconds. 5. Repeat 2 to 4 times to each side. Forward neck flexion   1. Sit in a firm chair, or stand up straight. 2. Bend your head forward. 3. Hold for 15 to 30 seconds. 4. Repeat 2 to 4 times. Lateral (side) bend strengthening   1. With your right hand, place your first two fingers on your right temple. 2. Start to bend your head to the side while using gentle pressure from your fingers to keep your head from bending. 3. Hold for about 6 seconds. 4. Repeat 8 to 12 times. 5. Switch hands and repeat the same exercise on your left side. Forward bend strengthening   1. Place your first two fingers of either hand on your forehead. 2. Start to bend your head forward while using gentle pressure from your fingers to keep your head from bending. 3. Hold for about 6 seconds. 4. Repeat 8 to 12 times. Neutral position strengthening   1. Using one hand, place your fingertips on the back of your head at the top of your neck. 2. Start to bend your head backward while using gentle pressure from your fingers to keep your head from bending. 3. Hold for about 6 seconds. 4. Repeat 8 to 12 times. Chin tuck   1. Lie on the floor with a rolled-up towel under your neck. Your head should be touching the floor. 2. Slowly bring your chin toward your chest.  3. Hold for a count of 6, and then relax for up to 10 seconds. 4. Repeat 8 to 12 times. Follow-up care is a key part of your treatment and safety. Be sure to make and go to all appointments, and call your doctor if you are having problems. It's also a good idea to know your test results and keep a list of the medicines you take.   Where can you learn more?  Go to http://www.gray.com/  Enter M679 in the search box to learn more about \"Neck Strain or Sprain: Rehab Exercises. \"  Current as of: November 16, 2020               Content Version: 12.8  © 3519-7219 [employer]. Care instructions adapted under license by [employer] (which disclaims liability or warranty for this information). If you have questions about a medical condition or this instruction, always ask your healthcare professional. [place] [address] any warranty or liability for your use of this information.

## 2021-05-05 NOTE — PROGRESS NOTES
Health Maintenance Due   Topic Date Due    Hepatitis C Screening  Never done    DTaP/Tdap/Td series (2 - Tdap) 12/13/1998    COVID-19 Vaccine (1) Never done

## 2021-05-25 ENCOUNTER — HOSPITAL ENCOUNTER (OUTPATIENT)
Dept: PHYSICAL THERAPY | Age: 36
Discharge: HOME | End: 2021-05-25
Payer: COMMERCIAL

## 2021-05-25 PROCEDURE — 97162 PT EVAL MOD COMPLEX 30 MIN: CPT

## 2021-05-25 PROCEDURE — 97110 THERAPEUTIC EXERCISES: CPT

## 2021-05-25 NOTE — PROGRESS NOTES
PT INITIAL EVALUATION NOTE - [facility] 2-15    Patient Name: Padilla Champion  Date:2021  : 1985  [x]  Patient  Verified  Payor: BLUE CROSS / Plan: [address] [place] [address] / Product Type: PPO /    In time: 2996  Out time: 1145  Total Treatment Time (min): 65  Total Timed Codes (min): 15  1:1 Treatment Time ( only): 15   Visit #: 1     Treatment Area: Right shoulder pain [M25.511]  Low back pain [M54.5]    SUBJECTIVE  Pain Level (0-10 scale): 8 currently in low back. The patient describes pain as \"burning\" in low back, \"sharp\" pain down legs, and \"throbbing, tingling\" down his outer upper R arm. Any medication changes, allergies to medications, adverse drug reactions, diagnosis change, or new procedure performed?: [] No    [x] Yes (see summary sheet for update)  Subjective:       *The patient is R hand dominant*    The patient reports insidious onset of low back pain many years ago, noting this has been \"on and off\" since he was playing football when younger; he notes low back pain began to worsen in  when he was lifting a 50# bag while on a ladder at work, when he [employer]" and tweaked his low back. Subsequently, he underwent course of care with PT in [place], which helped somewhat; he followed up with referring physician in 2018, who referred him to orthopedic specialist. X-rays of his low back at that time indicated \"a nerve hitting a bone in my back\", however his orthopedic specialist indicated he was not a candidate for surgery. He reports he was on FMLA, which helped him to rest his back when painful due to long hours at work, however his FMLA ran out last year. Last year, he notes onset of \"bad nerve pains\", when he will get sharp pain/tingling which shoots up and down his back and both legs when moving certain ways, and lasts for a little while causing him to pause for a moment at work prior to resuming.  He reports he has difficulty standing for longer periods of time on concrete floors at work. He does use stretches from his prior PT plan of care to manage his symptoms before work, which help to some degree. Additionally, patient notes insidious onset of R shoulder pain over the past two years, which tends to limit him most when holding his R arm up for any length of time, carrying his child, or pulling items off conveyer belt at work; he notes onset of tingling into his R elbow and back behind his shoulder. He does also report difficulty gripping. Application of ice appears to help R shoulder pain when it comes on. Current functional limitations include: standing > 10 minutes, walking > 1.5 hours, carrying his child 3-5 minutes, and work tasks involving repetitive lifting/twisting. Goals: \"Just to see what is wrong for my shoulder, and what kind of treatment I can get for my back. \"      OBJECTIVE/EXAMINATION    Posture: Narrow base of support, bilateral LE R > L, mild forward trunk flexion, bilateral shoulder internal rotation, decreased thoracic kyphosis, bilateral scapular winging/tipping, R shoulder lower than L  Other Observations: Squat: bilateral heel rise with increased squat depth, increased trunk flexion, increased knee flexion. Patient notes \"popping\" in back with return to neutral position.   Gait and Functional Mobility: Patient ambulates with increased ni, decreased bilateral step length, bilateral Trendelenberg  Palpation: Tender to palpation at R coracoid process (short head of biceps/pectoralis minor)/lumbar spinous processes L1-5/thoracic spinous processes T6-8    A/PROM Shoulder:   Right      Flexion   147 (p!)/161 (p!)    Abduction  159 (p!)/157 (p!)         Lumbar AROM:          R  L    Flexion    Fingertips to mid-tibias (p!)    Extension   50% (p!)      Side Bending   Fingertips 2cm from knee joint line (p!) Fingertips 5cm from knee joint line (L leg p!)    Rotation   75% (p!)          75% (p!)      Hip PROM:                             R L  Flexion                                     Limited (low back p!)    degrees (low back p!)  External Rotation                     WNL (p!)                      WNL (p!)  Internal Rotation                      26 (p!)                          32 (p!)    UPPER QUARTER     MUSCLE STRENGTH  KEY        R  L  0 - No Contraction   Flexion   4+ (p!)  5  1 - Trace    Extension (elbow) 4  4+  2 - Poor    Abduction  5             5  3 - Fair     IR   5  5  4 - Good    ER   4 (p!)  4+  5 - Normal    LOWER QUARTER   MUSCLE STRENGTH  KEY       R  L  0 - No Contraction  L1, L2 Psoas  4  4-  1 - Trace   L3 Quads  5  5  2 - Poor   L4 Tib Ant  5  5  3 - Fair    L5 EHL  5  4+  4 - Good   S1 Peroneals  4+  4+  5 - Normal   S2 Hams  5             4+ (L LE p!)    Heel Raise Test: R = 16x; L = 16x (noted reduced heel raise height L > R)    Flexibility: Hamstring 90-90 Test = (+) bilaterally; Ranjan Test = (+) bilaterally (iliopsoas)  Joint Mobility Assessment: Glenohumeral: Noted hypomobility to R inferior/AP joint glides      Thoracic: Noted mild hypomobility to thoracic PA joint glides (T4-10)      Lumbar: Noted mild hypomobility to lumbar PA/bilateral rotational joint glides (L1-4)    CKC Ankle Dorsiflexion Wall Test: R = 0.5cm/L = 1.5cm  from wall       MMT:               HIP Ext: R = 4+ (p!)/L = 4+ (p!)              HIP Abd: R = 4-/L = 4              HIP Add: R = 4/L = 3+    Neurological: Reflexes / Sensations: Noted \"difference\" in sensation to light touch at C5 dermatome on R; noted \"tingling\" to light touch at L C7/R C8 dermatomes. Dermatomes WNL to light touch throughout bilateral LEs.     Special Tests:                    Speed's: (+) on R   Yergason: (+) on R   Trendelenberg: (+) bilaterally  FABERS: (+) bilaterally    Slump: MSK response on R, overtly (+) with distal sensitizer (ankle dorsiflexion/plantar flexion) on L                                                       SLR: Overtly (+) with distal sensitizer (ankle dorsiflexion/plantar flexion) bilaterally              Prone Instability Test: (-)              Repeated Motions: Flexion = (+) for peripheralization on L; Extension = (+) for centralization on L  Functional Tests:  Single Limb Balance: R = 10 seconds; L = 8.1 seconds      *Increased lateral trunk lean with L > R single limb balance testing    15 min Therapeutic Exercise:  [x] See flow sheet :   Rationale: increase ROM, increase strength, improve coordination and increase proprioception to improve the patients ability to walk, stand, carry child, and perform work tasks          With   [x] TE   [] TA   [] Neuro   [] SC   [] other: Patient Education: [x] Review HEP    [] Progressed/Changed HEP based on:   [] positioning   [] body mechanics   [] transfers   [] heat/ice application    [] other:      Other Objective/Functional Measures: FOTO Functional Measure: 57/100 (lumbar spine); 50/100 (shoulder)                Pain Level (0-10 scale) post treatment: Patient noting decrease in low back pain, abolishment of L LE symptoms with ambulation after initial evaluation today; additionally noting increased R shoulder \"soreness\" related to introduction of doorway stretch    ASSESSMENT:      [x]  See Plan of [address] Teresa, PT, DPT 5/25/2021

## 2021-05-25 NOTE — PROGRESS NOTES
[address] Physical Therapy  [address] ([address]), [address] [place] Wilbert Rodriguez  Phone: [phone] Fax: [phone]    Plan of Care/Statement of Necessity for Physical Therapy Services  2-15    Patient name: Camryn Schaeffer  : 1985  Provider#: [id number]  Referral source: Nilam Villavicencio LPN      Medical/Treatment Diagnosis: Right shoulder pain [M25.511]  Low back pain [M54.5]     Prior Hospitalization: see medical history     Comorbidities: Tobacco use, asthma, chronic pain  Prior Level of Function: Independent, active work  Medications: Verified on Patient Summary List    Start of Care: 21      Onset Date: Chronic (low back); since approximately  (R shoulder)        The Plan of Care and following information is based on the information from the initial evaluation. Assessment/ key information:     The patient is a 37year old male who presents to physical therapy services due to chronic low back/bilateral LE and R shoulder pain. He demonstrates signs and symptoms consistent with mobility and neurodynamic impairments for low back/LE pain and mobility/muscle power deficits for R shoulder pain, including decreased and painful multiplanar R shoulder overhead ROM/lumbar AROM/bilateral hip PROM, decreased and painful R shoulder flexion/ER MMT, decreased multiplanar bilateral LE strength, significantly decreased bilateral ankle mobility/calf strength (CKC ankle dorsiflexion wall test: R = 0.5cm/L = 1.5cm), (+) bilateral SLR/L slump tests, (+) repeated motions testing for both flexion/extension, and decreased bilateral single limb balance (R = 10 seconds; L = 8.1 seconds). Additionally, patient demonstrates aberrant movement patterns with squatting and ambulation. Noted improvement in symptoms with ambulation after initial evaluation today.  The patient would benefit from continued skilled physical therapy services to improve symptom management, endurance, and independence with repetitive and prolonged home and work tasks. Evaluation Complexity History HIGH Complexity :3+ comorbidities / personal factors will impact the outcome/ POC ; Examination HIGH Complexity : 4+ Standardized tests and measures addressing body structure, function, activity limitation and / or participation in recreation  ;Presentation MEDIUM Complexity : Evolving with changing characteristics  ; Clinical Decision Making MEDIUM Complexity : FOTO score of 26-74  Overall Complexity Rating: MEDIUM    Problem List: pain affecting function, decrease ROM, decrease strength, impaired gait/ balance, decrease ADL/ functional abilitiies, decrease activity tolerance, decrease flexibility/ joint mobility and decrease transfer abilities   Treatment Plan may include any combination of the following: Therapeutic exercise, Therapeutic activities, Neuromuscular re-education, Physical agent/modality, Gait/balance training, Manual therapy, Patient education, Self Care training, Functional mobility training, Home safety training, Stair training and Other: Dry Needling  Patient / Family readiness to learn indicated by: asking questions, trying to perform skills and interest  Persons(s) to be included in education: patient (P)  Barriers to Learning/Limitations: None  Patient Goal (s): Just to see what is wrong for my shoulder, and what kind of treatment I can get for my back.   Patient Self Reported Health Status: fair  Rehabilitation Potential: good    Short Term Goals: To be accomplished in 6-8 treatments: The patient will demonstrate compliance and independence with updated and progressive HEP toward improved participation in physical therapy plan of care. The patient will demonstrate (-) L slump and bilateral SLR tests toward improved independence with prolonged standing and ambulation.               The patient will demonstrate bilateral hip IR PROM >= 40 degrees toward improved mechanics with squatting and ambulation. The patient will demonstrate R shoulder flexion and abduction AROM increased >= 10 degrees each toward improved independence with reaching tasks while at work. Long Term Goals: To be accomplished in 12-16 treatments: The patient will demonstrate R shoulder ER strength increased to >= 4+/5 on MMT scale without report of familiar R shoulder pain toward improved endurance with tasks such as carrying child and loading/unloading packages from conveyor. The patient will demonstrate CKC ankle dorsiflexion wall test >= 5cm from wall bilaterally toward improved mechanics with repetitive walking and squatting tasks. The patient will demonstrate bilateral hip abduction/adduction strength increased >= 1/2 MMT grade toward improved endurance with standing and walking during work tasks. The patient will score >= 70 on lumbar spine FOTO and >= 72 on shoulder FOTO to demonstrate significantly increased subjective report of function. Frequency / Duration: Patient to be seen 1-2 times per week for 12-16 treatments. Patient/ Caregiver education and instruction: self care, activity modification and exercises    [x]  Plan of care has been reviewed with LUIS EDUARDO Pablo PT, DPT 5/25/2021     ________________________________________________________________________    I certify that the above Therapy Services are being furnished while the patient is under my care. I agree with the treatment plan and certify that this therapy is necessary.     Physician's Signature:____________________  Date:____________Time: _________      Frank Santiago LPN

## 2021-06-03 ENCOUNTER — APPOINTMENT (OUTPATIENT)
Dept: PHYSICAL THERAPY | Age: 36
End: 2021-06-03
Payer: COMMERCIAL

## 2021-06-10 ENCOUNTER — HOSPITAL ENCOUNTER (OUTPATIENT)
Dept: PHYSICAL THERAPY | Age: 36
Discharge: HOME | End: 2021-06-10
Payer: COMMERCIAL

## 2021-06-10 PROCEDURE — 97110 THERAPEUTIC EXERCISES: CPT
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
## 2021-08-09 NOTE — PROGRESS NOTES
[address] [facility] Physical Therapy  [facility] [address] [place] Celina Fernandes  Phone: [phone] Fax: [phone]    Discharge Summary  2-15    Patient name: Bessy Ann  : 1985  Provider#: [id number]  Referral source: Ale Edge MD      Medical/Treatment Diagnosis: Right shoulder pain [M25.511]  Low back pain [M54.5]     Prior Hospitalization: see medical history     Comorbidities: See Plan of Care  Prior Level of Function:See Plan of Care  Medications: Verified on Patient Summary List    Start of Care: 21      Onset Date: Chronic (low back); since approximately 2019 (R shoulder)   Visits from Start of Care: 2     Missed Visits: 3  Reporting Period : 21 to 06/10/21    ASSESSMENT/SUMMARY OF CARE: The patient is discharged today, 2021, as they have stopped attending therapy. Final objective data and outcomes were unable to be obtained.     RECOMMENDATIONS:  [x]Discontinue therapy: []Patient has reached or is progressing toward set goals      [x]Patient is non-compliant or has abdicated      []Due to lack of appreciable progress towards set goals      []Other    Gayatri Russo, PT, DPT 2021

## 2022-05-09 NOTE — TELEPHONE ENCOUNTER
Received FMLA forms. I do not understand what he needs these for. Does not sound like this question has been answered in the last months worth of phone tag.   Probably just needs an appointment to go over these forms and get them filled ou [place]

## 2022-08-31 ENCOUNTER — APPOINTMENT (OUTPATIENT)
Dept: GENERAL RADIOLOGY | Age: 37
End: 2022-08-31
Attending: PHYSICIAN ASSISTANT

## 2022-08-31 ENCOUNTER — HOSPITAL ENCOUNTER (EMERGENCY)
Age: 37
Discharge: HOME | End: 2022-08-31
Attending: EMERGENCY MEDICINE

## 2022-08-31 VITALS
OXYGEN SATURATION: 99 % | RESPIRATION RATE: 16 BRPM | DIASTOLIC BLOOD PRESSURE: 75 MMHG | SYSTOLIC BLOOD PRESSURE: 134 MMHG | HEIGHT: 65 IN | BODY MASS INDEX: 34.45 KG/M2 | HEART RATE: 78 BPM | WEIGHT: 206.79 LBS | TEMPERATURE: 99.2 F

## 2022-08-31 DIAGNOSIS — F17.200 SMOKING ADDICTION: ICD-10-CM

## 2022-08-31 DIAGNOSIS — M54.41 ACUTE BILATERAL LOW BACK PAIN WITH RIGHT-SIDED SCIATICA: ICD-10-CM

## 2022-08-31 DIAGNOSIS — J45.21 MILD INTERMITTENT ASTHMA WITH EXACERBATION: ICD-10-CM

## 2022-08-31 DIAGNOSIS — D17.21 LIPOMA OF RIGHT SHOULDER: ICD-10-CM

## 2022-08-31 DIAGNOSIS — Z20.822 PERSON UNDER INVESTIGATION FOR COVID-19: Primary | ICD-10-CM

## 2022-08-31 LAB
SARS-COV-2, XPLCVT: NOT DETECTED
SOURCE, COVRS: NORMAL

## 2022-08-31 PROCEDURE — 71045 X-RAY EXAM CHEST 1 VIEW: CPT

## 2022-08-31 PROCEDURE — 99284 EMERGENCY DEPT VISIT MOD MDM: CPT

## 2022-08-31 PROCEDURE — U0005 INFEC AGEN DETEC AMPLI PROBE: HCPCS

## 2022-08-31 RX ORDER — CYCLOBENZAPRINE HCL 10 MG
10 TABLET ORAL
Qty: 20 TABLET | Refills: 0 | Status: SHIPPED | OUTPATIENT
Start: 2022-08-31

## 2022-08-31 RX ORDER — PREDNISONE 10 MG/1
TABLET ORAL
Qty: 21 TABLET | Refills: 0 | Status: SHIPPED | OUTPATIENT
Start: 2022-08-31

## 2022-08-31 RX ORDER — ALBUTEROL SULFATE 90 UG/1
2 AEROSOL, METERED RESPIRATORY (INHALATION)
Qty: 1 EACH | Refills: 1 | Status: SHIPPED | OUTPATIENT
Start: 2022-08-31

## 2022-08-31 RX ORDER — IBUPROFEN 800 MG/1
800 TABLET ORAL
Qty: 20 TABLET | Refills: 0 | Status: SHIPPED | OUTPATIENT
Start: 2022-08-31 | End: 2022-09-07

## 2022-08-31 NOTE — ED PROVIDER NOTES
EMERGENCY DEPARTMENT HISTORY AND PHYSICAL EXAM      Please note that this dictation was completed with [employer], the computer voice recognition software. Quite often unanticipated grammatical, syntax, homophones, and other interpretive errors are inadvertently transcribed by the computer software. Please disregard these errors. Please excuse any errors that have escaped final proofreading. Date: 8/31/2022  Patient Name: Tanya Loaiza    History of Presenting Illness     Chief Complaint   Patient presents with    Chills           Generalized Body Aches     PT arrives ambulatory to triage, reports chills and body aches (specifically R shoulder and lower back). Pt reports recent diagnosis of sciatica. History Provided By: Patient    HPI: Tanya Loaiza, 31 y.o. male with a history of asthma presents ambulatory to the ED with several complaints. 1) he tells me he woke up this morning and was \"feeling like I was getting sick\". When asked what that meant he tells me he felt a little congested with some chills. He does have a history of asthma and tells me he has run out of his inhaler. He has had some cough and has had some chest tightness. There has been no fever. He has taken nothing for his symptoms. There are no known sick contacts and there has been no recent travel. He tells me and medical records reflect that he did have a positive COVID-19 test on Mary Ellen 3, 2022. 2) he tells me his sciatica has been acting up over the past couple of days. He tells me he had some back spasms last night but that has improved. Pain is worse with movement and walking. In spite of the pain, he denies any difficulty walking. He has taken nothing for his symptoms. He denies any injury. Pain is mild across his low back and radiates slightly down the right buttocks.   He tells me this has been a problem for a while and he completed physical therapy and still performs the exercises from time to time, however the symptoms seem to come back every now and then. 3) he tells me he has a \"lump\" on the front part of his right shoulder. He tells me it does not hurt all the time, but does seem to bother him when he is trying to perform \"side jobs\". He tells me he was told in the past that it was a \"fatty tumor\" however because he is here he would like to get it \"checked out. He denies any injury. When there is pain, it is well localized and does not radiate. He denies any throat pain, eye pain, chest pain, shortness of breath, abdominal pain, flank pain, skin rash or headache. Regarding his back he denies any numbness or weakness of his groin or leg. Denies any dysuria or any urinary symptoms. Unfortunately, he does smoke cigarettes. He tells me he is not working right now however is hopeful to begin work sometime within the next few weeks. There are no other complaints, changes, or physical findings at this time. PCP: Nitin Benz MD    Current Outpatient Medications   Medication Sig Dispense Refill    ibuprofen (MOTRIN) 800 mg tablet Take 1 Tablet by mouth every eight (8) hours as needed for Pain for up to 7 days. 20 Tablet 0    cyclobenzaprine (FLEXERIL) 10 mg tablet Take 1 Tablet by mouth three (3) times daily as needed for Muscle Spasm(s). 20 Tablet 0    albuterol (PROVENTIL HFA, VENTOLIN HFA, PROAIR HFA) 90 mcg/actuation inhaler Take 2 Puffs by inhalation every four (4) hours as needed for Wheezing, Cough or Shortness of Breath. 1 Each 1    predniSONE (STERAPRED DS) 10 mg dose pack Per Dose Pack instructions 21 Tablet 0     Past History     Past Medical History:  No past medical history on file. Past Surgical History:  No past surgical history on file. Family History:  No family history on file.     Social History:  Social History     Tobacco Use    Smoking status: Every Day     Packs/day: 1.00     Years: 11.00     Pack years: 11.00     Types: Cigarettes    Smokeless tobacco: Never   Vaping Use Vaping Use: Never used   Substance Use Topics    Alcohol use: No    Drug use: No       Allergies:  No Known Allergies  Review of Systems   Review of Systems   Constitutional:  Positive for chills. Negative for fever. HENT:  Positive for congestion. Negative for sore throat. Eyes:  Negative for pain. Respiratory:  Positive for chest tightness and wheezing. Negative for shortness of breath. Cardiovascular:  Negative for chest pain. Gastrointestinal:  Negative for abdominal pain. Genitourinary:  Positive for dysuria. Negative for flank pain. Musculoskeletal:  Positive for back pain. Negative for gait problem. Right shoulder pain   Skin:  Negative for rash. Neurological:  Negative for weakness, numbness and headaches. Physical Exam   Physical Exam  Vitals and nursing note reviewed. Constitutional:       General: He is not in acute distress. Appearance: He is well-developed. He is not toxic-appearing. HENT:      Head: Normocephalic and atraumatic. No right periorbital erythema or left periorbital erythema. Right Ear: External ear normal.      Left Ear: External ear normal.      Ears:      Comments:   Canals are unobstructed bilaterally and TMs are translucent bilaterally without erythema     Nose: Nose normal.      Mouth/Throat:      Mouth: Mucous membranes are moist.      Comments:   No trismus  Moist mucous membranes  No posterior oropharyngeal erythema, edema or exudate  Eyes:      General: No scleral icterus. Conjunctiva/sclera: Conjunctivae normal.      Pupils: Pupils are equal, round, and reactive to light. Cardiovascular:      Rate and Rhythm: Normal rate. Pulmonary:      Effort: Pulmonary effort is normal. No tachypnea or respiratory distress. Breath sounds: Examination of the right-upper field reveals wheezing. Wheezing present.       Comments:   Talking in complete sentences without pause  No tachypnea  There are subtle, late expiratory wheezes of the right upper lungs  Abdominal:      Palpations: Abdomen is soft. Tenderness: There is no abdominal tenderness. Comments:   Soft and nontender  No CVA tenderness   Musculoskeletal:         General: Normal range of motion. Right shoulder: Normal range of motion. Arms:       Cervical back: Normal range of motion. Comments:   RIGHT SHOULDER:  There is a round, rubbery, mobile mass under the skin of the anterior deltoid. The lesion is nontender. The lesion is well-defined. Overlying skin is without color change and is without redness or dimpling. He has full active range of the right shoulder in all planes without limitation  Symmetric upper extremity distal pulses   Skin:     Findings: No rash. Neurological:      Mental Status: He is alert and oriented to person, place, and time. He is not disoriented. Cranial Nerves: No cranial nerve deficit. Sensory: Sensation is intact. No sensory deficit. Motor: Motor function is intact. No weakness. Comments:   Ambulates with a normal gait no limp  Equal sensation described bilaterally along all lower extremity dermatomes, from the groin to the toes. Strength of both lower extremities is full and symmetric  Negative seated straight leg raise bilaterally     Psychiatric:         Speech: Speech normal.     Diagnostic Study Results     Labs -   No results found for this or any previous visit (from the past 12 hour(s)). Radiologic Studies -   XR CHEST PORT   Final Result   No acute cardiopulmonary disease. CT Results  (Last 48 hours)      None          CXR Results  (Last 48 hours)                 08/31/22 1248  XR CHEST PORT Final result    Impression:  No acute cardiopulmonary disease. Narrative:  INDICATION: Chills, generalized body aches, pain. Portable AP view of the chest.       Direct comparison made to prior chest x-ray dated June 2013. Cardiomediastinal silhouette is stable.  Lungs are clear bilaterally. Pleural   spaces are normal and there is no pneumothorax. Osseous structures are intact. Medical Decision Making   I am the first provider for this patient. I reviewed the vital signs, available nursing notes, past medical history, past surgical history, family history and social history. Vital Signs-Reviewed the patient's vital signs. Patient Vitals for the past 12 hrs:   Temp Pulse Resp BP SpO2   08/31/22 1222 99.2 °F (37.3 °C) 78 16 134/75 99 %       Pulse Oximetry Analysis - 99% on RA    Records Reviewed: Nursing Notes and Old Medical Records    Provider Notes (Medical Decision Making):   DDx: pneumonia, COVID-19, mild intermittent asthma with exacerbation, acute bronchospasm, smoking addiction, lumbar radiculopathy, acute exacerbation of chronic low back pain, lipoma, smoking addiction    ED Course:   Initial assessment performed. The patients presenting problems have been discussed, and they are in agreement with the care plan formulated and outlined with them. I have encouraged them to ask questions as they arise throughout their visit. Disposition:  Discharge    PLAN:  1. Discharge Medication List as of 8/31/2022  1:42 PM        START taking these medications    Details   ibuprofen (MOTRIN) 800 mg tablet Take 1 Tablet by mouth every eight (8) hours as needed for Pain for up to 7 days. , Normal, Disp-20 Tablet, R-0      cyclobenzaprine (FLEXERIL) 10 mg tablet Take 1 Tablet by mouth three (3) times daily as needed for Muscle Spasm(s). , Normal, Disp-20 Tablet, R-0      albuterol (PROVENTIL HFA, VENTOLIN HFA, PROAIR HFA) 90 mcg/actuation inhaler Take 2 Puffs by inhalation every four (4) hours as needed for Wheezing, Cough or Shortness of Breath., Normal, Disp-1 Each, R-1      predniSONE (STERAPRED DS) 10 mg dose pack Per Dose Pack instructions, Normal, Disp-21 Tablet, R-0           2.    Follow-up Information       Follow up With Specialties Details Why Contact Info Rj Holloway MD Family Medicine Call  PRIMARY CARE: as needed regarding your ER visit [address]  [address] [facility]      Olivia Morocho MD General Surgery, Surgery General, Oncology Call  SURGERY: as needed regarding the lipoma of your right shoulder [address]  [address]  [phone]            Return to ED if worse     Diagnosis     Clinical Impression:   1. Person under investigation for COVID-19    2. Mild intermittent asthma with exacerbation    3. Acute bilateral low back pain with right-sided sciatica    4. Lipoma of right shoulder    5.  Smoking addiction